# Patient Record
Sex: FEMALE | Race: WHITE | NOT HISPANIC OR LATINO | Employment: OTHER | URBAN - METROPOLITAN AREA
[De-identification: names, ages, dates, MRNs, and addresses within clinical notes are randomized per-mention and may not be internally consistent; named-entity substitution may affect disease eponyms.]

---

## 2017-11-24 ENCOUNTER — HOSPITAL ENCOUNTER (EMERGENCY)
Facility: HOSPITAL | Age: 62
Discharge: HOME | End: 2017-11-24
Payer: MEDICARE

## 2017-11-24 VITALS
OXYGEN SATURATION: 95 % | HEIGHT: 62 IN | HEART RATE: 76 BPM | WEIGHT: 133.81 LBS | BODY MASS INDEX: 24.63 KG/M2 | SYSTOLIC BLOOD PRESSURE: 112 MMHG | TEMPERATURE: 98 F | RESPIRATION RATE: 16 BRPM | DIASTOLIC BLOOD PRESSURE: 64 MMHG

## 2017-11-24 DIAGNOSIS — I25.10 CORONARY ARTERY DISEASE, ANGINA PRESENCE UNSPECIFIED, UNSPECIFIED VESSEL OR LESION TYPE, UNSPECIFIED WHETHER NATIVE OR TRANSPLANTED HEART: ICD-10-CM

## 2017-11-24 DIAGNOSIS — J18.9 PNEUMONIA OF RIGHT LUNG DUE TO INFECTIOUS ORGANISM, UNSPECIFIED PART OF LUNG: Primary | ICD-10-CM

## 2017-11-24 DIAGNOSIS — F17.200 SMOKING: ICD-10-CM

## 2017-11-24 DIAGNOSIS — R07.9 CHEST PAIN: ICD-10-CM

## 2017-11-24 LAB
ALBUMIN SERPL BCP-MCNC: 3.8 G/DL
ALP SERPL-CCNC: 124 U/L
ALT SERPL W/O P-5'-P-CCNC: 19 U/L
ANION GAP SERPL CALC-SCNC: 13 MMOL/L
AST SERPL-CCNC: 20 U/L
BASOPHILS # BLD AUTO: 0.01 K/UL
BASOPHILS NFR BLD: 0.2 %
BILIRUB SERPL-MCNC: 0.6 MG/DL
BNP SERPL-MCNC: 36 PG/ML
BUN SERPL-MCNC: 18 MG/DL
CALCIUM SERPL-MCNC: 9.4 MG/DL
CHLORIDE SERPL-SCNC: 104 MMOL/L
CO2 SERPL-SCNC: 21 MMOL/L
CREAT SERPL-MCNC: 0.8 MG/DL
DIFFERENTIAL METHOD: ABNORMAL
EOSINOPHIL # BLD AUTO: 0.1 K/UL
EOSINOPHIL NFR BLD: 0.8 %
ERYTHROCYTE [DISTWIDTH] IN BLOOD BY AUTOMATED COUNT: 13.2 %
EST. GFR  (AFRICAN AMERICAN): >60 ML/MIN/1.73 M^2
EST. GFR  (NON AFRICAN AMERICAN): >60 ML/MIN/1.73 M^2
GLUCOSE SERPL-MCNC: 154 MG/DL
HCT VFR BLD AUTO: 48.3 %
HGB BLD-MCNC: 16.9 G/DL
LYMPHOCYTES # BLD AUTO: 1 K/UL
LYMPHOCYTES NFR BLD: 15.8 %
MCH RBC QN AUTO: 31.4 PG
MCHC RBC AUTO-ENTMCNC: 35 G/DL
MCV RBC AUTO: 90 FL
MONOCYTES # BLD AUTO: 0.2 K/UL
MONOCYTES NFR BLD: 3.5 %
NEUTROPHILS # BLD AUTO: 5 K/UL
NEUTROPHILS NFR BLD: 79.7 %
PLATELET # BLD AUTO: 149 K/UL
PMV BLD AUTO: 10 FL
POTASSIUM SERPL-SCNC: 4.3 MMOL/L
PROT SERPL-MCNC: 7.6 G/DL
RBC # BLD AUTO: 5.38 M/UL
SODIUM SERPL-SCNC: 138 MMOL/L
TROPONIN I SERPL DL<=0.01 NG/ML-MCNC: 0.01 NG/ML
TROPONIN I SERPL DL<=0.01 NG/ML-MCNC: <0.006 NG/ML
WBC # BLD AUTO: 6.27 K/UL

## 2017-11-24 PROCEDURE — 85025 COMPLETE CBC W/AUTO DIFF WBC: CPT

## 2017-11-24 PROCEDURE — 84484 ASSAY OF TROPONIN QUANT: CPT | Mod: 91

## 2017-11-24 PROCEDURE — 80053 COMPREHEN METABOLIC PANEL: CPT

## 2017-11-24 PROCEDURE — 96375 TX/PRO/DX INJ NEW DRUG ADDON: CPT

## 2017-11-24 PROCEDURE — 99285 EMERGENCY DEPT VISIT HI MDM: CPT | Mod: 25

## 2017-11-24 PROCEDURE — 25000003 PHARM REV CODE 250: Performed by: PHYSICIAN ASSISTANT

## 2017-11-24 PROCEDURE — 83880 ASSAY OF NATRIURETIC PEPTIDE: CPT

## 2017-11-24 PROCEDURE — 96374 THER/PROPH/DIAG INJ IV PUSH: CPT

## 2017-11-24 PROCEDURE — 93010 ELECTROCARDIOGRAM REPORT: CPT | Mod: ,,, | Performed by: INTERNAL MEDICINE

## 2017-11-24 PROCEDURE — 63600175 PHARM REV CODE 636 W HCPCS: Performed by: PHYSICIAN ASSISTANT

## 2017-11-24 PROCEDURE — 93005 ELECTROCARDIOGRAM TRACING: CPT

## 2017-11-24 RX ORDER — PRASUGREL 10 MG/1
10 TABLET, FILM COATED ORAL DAILY
COMMUNITY

## 2017-11-24 RX ORDER — CARVEDILOL 12.5 MG/1
12.5 TABLET ORAL 2 TIMES DAILY WITH MEALS
COMMUNITY

## 2017-11-24 RX ORDER — BUPROPION HYDROCHLORIDE 150 MG/1
150 TABLET, EXTENDED RELEASE ORAL DAILY
COMMUNITY
End: 2022-04-18 | Stop reason: CLARIF

## 2017-11-24 RX ORDER — ONDANSETRON 2 MG/ML
4 INJECTION INTRAMUSCULAR; INTRAVENOUS
Status: COMPLETED | OUTPATIENT
Start: 2017-11-24 | End: 2017-11-24

## 2017-11-24 RX ORDER — LEVOFLOXACIN 250 MG/1
750 TABLET ORAL DAILY
Qty: 21 TABLET | Refills: 0 | Status: SHIPPED | OUTPATIENT
Start: 2017-11-24 | End: 2017-12-01

## 2017-11-24 RX ORDER — NITROGLYCERIN 0.4 MG/1
0.4 TABLET SUBLINGUAL EVERY 5 MIN PRN
COMMUNITY

## 2017-11-24 RX ORDER — AMLODIPINE BESYLATE 5 MG/1
5 TABLET ORAL DAILY
COMMUNITY

## 2017-11-24 RX ORDER — CEFTRIAXONE 2 G/1
2 INJECTION, POWDER, FOR SOLUTION INTRAMUSCULAR; INTRAVENOUS
Status: COMPLETED | OUTPATIENT
Start: 2017-11-24 | End: 2017-11-24

## 2017-11-24 RX ORDER — ASPIRIN 325 MG
325 TABLET ORAL
Status: COMPLETED | OUTPATIENT
Start: 2017-11-24 | End: 2017-11-24

## 2017-11-24 RX ADMIN — ONDANSETRON HYDROCHLORIDE 4 MG: 2 INJECTION, SOLUTION INTRAMUSCULAR; INTRAVENOUS at 06:11

## 2017-11-24 RX ADMIN — CEFTRIAXONE SODIUM 2 G: 2 INJECTION, POWDER, FOR SOLUTION INTRAMUSCULAR; INTRAVENOUS at 09:11

## 2017-11-24 RX ADMIN — ASPIRIN 325 MG ORAL TABLET 325 MG: 325 PILL ORAL at 06:11

## 2017-11-24 NOTE — ED PROVIDER NOTES
"SCRIBE #1 NOTE: I, Angelina Ohara, am scribing for, and in the presence of, SASHA Leonardo. I have scribed the entire note.      History      Chief Complaint   Patient presents with    Emesis     "Melany been throwing up and my grandson has strep throat."       Review of patient's allergies indicates:  No Known Allergies     HPI   HPI    11/24/2017, 5:34 PM   History obtained from the patient      History of Present Illness: Trish Wu is a 62 y.o. female patient, with a PSHx of a CABG and cardiac stents and a PMHx of DM, multiple MI's, and CAD, who presents to the Emergency Department for CP which onset gradually this morning. Pain is located to the substernal chest and does not radiate. Sx have been intermittent and moderate in severity.  Prior treatment includes NTG this morning with improvement. Associated sx include intermittent SOB, nausea, diffuse abdominal cramping, and 5 episodes of emesis. Pt denies any fever, chills, leg swelling, or dizziness. Pt is a current everyday smoker.   Pt is also complaining of a sore throat which onset gradually this morning. Pt reports positive sick contact with her grandchildren who have strep throat. Sx have been constant and moderate in severity. No modifying factors noted. Associated sx include cough, congestion, and rhinorrhea. Pt denies any trouble swallowing or ear pain. No further complaints at this time.       Arrival mode: Personal vehicle     PCP: Unknown [facility]       Past Medical History:  Past Medical History:   Diagnosis Date    Abnormality of heart valve     Pt unsure of history    CAD (coronary artery disease)     Diabetes mellitus     Hypertension     MI (myocardial infarction)        Past Surgical History:  Past Surgical History:   Procedure Laterality Date    CORONARY ANGIOPLASTY WITH STENT PLACEMENT      CORONARY ARTERY BYPASS GRAFT  2005         Family History:  History reviewed. No pertinent family history.    Social History:  Social " History     Social History Main Topics    Smoking status: Current Every Day Smoker     Packs/day: 1.00     Types: Cigarettes    Smokeless tobacco: Never Used    Alcohol use 0.6 oz/week     1 Standard drinks or equivalent per week    Drug use: No    Sexual activity: Unknown        ROS   Review of Systems   Constitutional: Negative for chills and fever.   HENT: Positive for congestion, rhinorrhea and sore throat. Negative for ear pain and trouble swallowing.    Respiratory: Positive for cough and shortness of breath.    Cardiovascular: Positive for chest pain. Negative for leg swelling.   Gastrointestinal: Positive for abdominal pain, nausea and vomiting. Negative for constipation and diarrhea.   Genitourinary: Negative for dysuria.   Musculoskeletal: Negative for back pain.   Skin: Negative for rash.   Neurological: Negative for dizziness, weakness, light-headedness, numbness and headaches.   Hematological: Does not bruise/bleed easily.     Physical Exam      Initial Vitals [11/24/17 1713]   BP Pulse Resp Temp SpO2   (!) 152/93 110 18 99 °F (37.2 °C) 96 %      MAP       112.67          Physical Exam  Nursing Notes and Vital Signs Reviewed.  Constitutional: Patient is in no acute distress. Well-developed and well-nourished.  Head: Atraumatic. Normocephalic.  Eyes: PERRL. EOM intact. Conjunctivae are not pale. No scleral icterus.  Ears: Right TM normal. Left TM normal. No erythema. No bulging. No effusion or air-fluid levels. No perforation.   Nose: Patent nares. Turbinates are normal. No drainage.   Throat: Moist mucous membranes. Posterior oropharynx is symmetric without erythema. Tonsillar exudate is not present. No trismus. Normal handling of secretions. No stridor.   Neck: Supple. Full ROM. No lymphadenopathy.  Cardiovascular: Regular rate. Regular rhythm. No murmurs, rubs, or gallops. Distal pulses are 2+ and symmetric.  Pulmonary/Chest: No respiratory distress. Clear to auscultation bilaterally. No wheezing  "or rales.  Abdominal: Soft and non-distended.  There is no tenderness.  No rebound, guarding, or rigidity.  Musculoskeletal: Moves all extremities. No obvious deformities. No edema. No calf tenderness.  Skin: Warm and dry.  Neurological:  Alert, awake, and appropriate.  Normal speech.  No acute focal neurological deficits are appreciated.  Psychiatric: Normal affect. Good eye contact. Appropriate in content.    ED Course    Procedures  ED Vital Signs:  Vitals:    11/24/17 1713 11/24/17 1911 11/24/17 1957 11/24/17 2037   BP: (!) 152/93 119/68 100/64 (!) 105/59   Pulse: 110 92 85 85   Resp: 18 18 16 16   Temp: 99 °F (37.2 °C)  98.3 °F (36.8 °C)    TempSrc: Oral      SpO2: 96% 95% 95% 95%   Weight: 60.7 kg (133 lb 13.1 oz)      Height: 5' 2" (1.575 m)          Abnormal Lab Results:  Labs Reviewed   CBC W/ AUTO DIFFERENTIAL - Abnormal; Notable for the following:        Result Value    Hemoglobin 16.9 (*)     MCH 31.4 (*)     Platelets 149 (*)     Mono # 0.2 (*)     Gran% 79.7 (*)     Lymph% 15.8 (*)     Mono% 3.5 (*)     All other components within normal limits   COMPREHENSIVE METABOLIC PANEL - Abnormal; Notable for the following:     CO2 21 (*)     Glucose 154 (*)     All other components within normal limits   TROPONIN I   TROPONIN I   B-TYPE NATRIURETIC PEPTIDE        All Lab Results:  Results for orders placed or performed during the hospital encounter of 11/24/17   CBC auto differential   Result Value Ref Range    WBC 6.27 3.90 - 12.70 K/uL    RBC 5.38 4.00 - 5.40 M/uL    Hemoglobin 16.9 (H) 12.0 - 16.0 g/dL    Hematocrit 48.3 37.0 - 48.5 %    MCV 90 82 - 98 fL    MCH 31.4 (H) 27.0 - 31.0 pg    MCHC 35.0 32.0 - 36.0 g/dL    RDW 13.2 11.5 - 14.5 %    Platelets 149 (L) 150 - 350 K/uL    MPV 10.0 9.2 - 12.9 fL    Gran # 5.0 1.8 - 7.7 K/uL    Lymph # 1.0 1.0 - 4.8 K/uL    Mono # 0.2 (L) 0.3 - 1.0 K/uL    Eos # 0.1 0.0 - 0.5 K/uL    Baso # 0.01 0.00 - 0.20 K/uL    Gran% 79.7 (H) 38.0 - 73.0 %    Lymph% 15.8 (L) 18.0 - " 48.0 %    Mono% 3.5 (L) 4.0 - 15.0 %    Eosinophil% 0.8 0.0 - 8.0 %    Basophil% 0.2 0.0 - 1.9 %    Differential Method Automated    Comprehensive metabolic panel   Result Value Ref Range    Sodium 138 136 - 145 mmol/L    Potassium 4.3 3.5 - 5.1 mmol/L    Chloride 104 95 - 110 mmol/L    CO2 21 (L) 23 - 29 mmol/L    Glucose 154 (H) 70 - 110 mg/dL    BUN, Bld 18 8 - 23 mg/dL    Creatinine 0.8 0.5 - 1.4 mg/dL    Calcium 9.4 8.7 - 10.5 mg/dL    Total Protein 7.6 6.0 - 8.4 g/dL    Albumin 3.8 3.5 - 5.2 g/dL    Total Bilirubin 0.6 0.1 - 1.0 mg/dL    Alkaline Phosphatase 124 55 - 135 U/L    AST 20 10 - 40 U/L    ALT 19 10 - 44 U/L    Anion Gap 13 8 - 16 mmol/L    eGFR if African American >60 >60 mL/min/1.73 m^2    eGFR if non African American >60 >60 mL/min/1.73 m^2   Troponin I #1   Result Value Ref Range    Troponin I <0.006 0.000 - 0.026 ng/mL   Troponin I #2   Result Value Ref Range    Troponin I 0.008 0.000 - 0.026 ng/mL   B-Type natriuretic peptide (BNP)   Result Value Ref Range    BNP 36 0 - 99 pg/mL         Imaging Results:  Imaging Results          X-Ray Chest PA And Lateral (Final result)  Result time 11/24/17 19:47:42    Final result by Hilton Palacios MD (11/24/17 19:47:42)                 Impression:           1.  Subtle area of increased density within the right lung apex.  This could be an infiltrate in the right clinical setting.  Treatment for presumed pneumonia followup x-rays in 4-6 weeks highly recommended to ensure resolution.  2.  Incidental findings as noted above.      Electronically signed by: HILTON PALACIOS MD  Date:     11/24/17  Time:    19:47              Narrative:    PA and Lateral Chest x-ray    Clinical Indication: Chest pain.       Findings:    No comparison studies are available.   There is a subtle area of increased density within the right lung apex.  The lungs are otherwise clear. The cardiac silhouette size is normal. The trachea is midline and the mediastinal width is normal. Negative  for focal infiltrate, effusion or pneumothorax. Pulmonary vasculature is normal. Negative for osseous abnormalities. There are changes of a prior median sternotomy and CABG changes. There are calcifications of the aortic knob and tortuosity of the descending thoracic aorta. There are degenerative changes of spine and both shoulder girdles. There are apical pleuroparenchymal changes.                                      The Emergency Provider reviewed the vital signs and test results, which are outlined above.    ED Discussion     6:21 PM: SASHA Leonardo discussed the pt's case with Dr. Hankins (Emergency Department) who recommends admitting pt to hospital medicine.    6:29 PM: Re-evaluated pt. Pt is resting comfortably and is in no acute distress. D/w pt all pertinent results. D/w pt any concerns expressed at this time. Answered all questions. Pt expresses understanding at this time.    6:53 PM: SASHA Leonardo discussed the pt's case with Nunu Crooks NP ([facility]) who recommends calling back for admission when labs have resulted.    8:53 PM: SASHA Leonardo discussed the pt's case with Dr. Villa ([facility]) who recommends ordering a repeat troponin at 10 o'clock. He states that if troponin is normal then pt can be discharged home and treated for pneumonia outpatient.      ED Medication(s):  Medications   aspirin tablet 325 mg (325 mg Oral Given 11/24/17 1858)   ondansetron injection 4 mg (4 mg Intravenous Given 11/24/17 1859)   cefTRIAXone injection 2 g (2 g Intravenous Given 11/24/17 5127)       Discharge Medication List as of 11/24/2017 11:51 PM      START taking these medications    Details   levoFLOXacin (LEVAQUIN) 250 MG tablet Take 3 tablets (750 mg total) by mouth once daily., Starting Fri 11/24/2017, Until Fri 12/1/2017, Print                   Medical Decision Making              Scribe Attestation:   Scribe #1: I performed the above scribed service and the  documentation accurately describes the services I performed. I attest to the accuracy of the note.    Attending:   Physician Attestation Statement for Scribe #1: I, SASHA Leonardo, personally performed the services described in this documentation, as scribed by Angelina Ohara, in my presence, and it is both accurate and complete.          Clinical Impression       ICD-10-CM ICD-9-CM   1. Pneumonia of right lung due to infectious organism, unspecified part of lung J18.9 483.8   2. Chest pain R07.9 786.50   3. Coronary artery disease, angina presence unspecified, unspecified vessel or lesion type, unspecified whether native or transplanted heart I25.10 414.00   4. Smoking F17.200 305.1       Disposition:   Disposition: Discharged  Condition: Stable         Malini Mtz PA-C  11/25/17 1401

## 2017-11-25 NOTE — ED NOTES
Pt reports resolution of CP. Pt denies any other needs at this time, informed to alert staff should any future needs arise. Side rails up x2, call light within reach, bed locked/lowered. Pt aware of need of use for call light. Lights dimmed for comfort.

## 2018-01-22 PROBLEM — I25.10 CORONARY ATHEROSCLEROSIS OF NATIVE CORONARY ARTERY: Status: ACTIVE | Noted: 2018-01-22

## 2019-08-30 PROBLEM — I73.89 ACROCYANOSIS: Status: ACTIVE | Noted: 2019-08-30

## 2022-04-21 PROBLEM — I73.9 PAD (PERIPHERAL ARTERY DISEASE): Status: ACTIVE | Noted: 2022-04-21

## 2022-05-09 PROBLEM — R91.8 MASS OF UPPER LOBE OF RIGHT LUNG: Status: ACTIVE | Noted: 2022-05-09
